# Patient Record
Sex: MALE | Race: WHITE
[De-identification: names, ages, dates, MRNs, and addresses within clinical notes are randomized per-mention and may not be internally consistent; named-entity substitution may affect disease eponyms.]

---

## 2018-10-06 ENCOUNTER — HOSPITAL ENCOUNTER (EMERGENCY)
Dept: HOSPITAL 80 - FED | Age: 23
LOS: 1 days | Discharge: HOME | End: 2018-10-07
Payer: MEDICAID

## 2018-10-06 DIAGNOSIS — F23: Primary | ICD-10-CM

## 2018-10-06 DIAGNOSIS — F19.10: ICD-10-CM

## 2018-10-06 PROCEDURE — G0480 DRUG TEST DEF 1-7 CLASSES: HCPCS

## 2018-10-06 PROCEDURE — GZ11ZZZ PSYCHOLOGICAL TESTS, PERSONALITY AND BEHAVIORAL: ICD-10-PCS

## 2018-10-06 NOTE — EDPHY
H & P


Smoking Status: Current every day smoker


Time Seen by Provider: 10/06/18 23:54


HPI/ROS: 





CHIEF COMPLAINT:  Suicidal ideation





HISTORY OF PRESENT ILLNESS:  Patient is a 22-year-old male who was being 

transported to the [facility] when he threatened suicidal ideation.  He was 

transported here for further evaluation.  Time of eye evaluation been sedated 

using IV Ativan was unable to provide his own history.  According to police 

officers he states that he had a plan to"jump off a mountain and kill himself"

he states that he has a history of multiple suicide attempts.  He also admits 

to use of heroin and methamphetamines.





REVIEW OF SYSTEMS:


Constitutional:  No fever, no chills.


Eyes:  No discharge.


ENT:  No sore throat.


Cardiovascular:  No chest pain, no palpitations.


Respiratory:  No cough, no shortness of breath.


Gastrointestinal:  No abdominal pain, no vomiting.


Genitourinary:  No hematuria.


Musculoskeletal:  No back pain.


Skin:  No rashes.


Neurological:  No headache. (Mike Sadler)


Physical Exam: 





General Appearance:  Alert and no distress.


Eyes:  Pupils equal and round no injection.


Respiratory:  Chest is nontender, lungs are clear to auscultation.


Cardiac:  regular rate and rhythm.


Gastrointestinal:  Abdomen is soft and nontender, no masses, bowel sounds 

normal.


Musculoskeletal:  Neck is supple and nontender.


Extremities have full range of motion and are nontender.


Skin:  No rashes or lesions.


 (Mike Sadler)


Constitutional: 


 Initial Vital Signs











Temperature (C)  36.7 C   10/06/18 23:48


 


Heart Rate  100   10/06/18 23:48


 


Respiratory Rate  22 H  10/06/18 23:48


 


Blood Pressure  159/103 H  10/06/18 23:48


 


O2 Sat (%)  96   10/06/18 23:48








 











O2 Delivery Mode               Room Air














Allergies/Adverse Reactions: 


 





No Known Allergies Allergy (Unverified 10/06/18 23:51)


 








Home Medications: 














 Medication  Instructions  Recorded


 


NK [No Known Home Meds]  10/06/18














Medical Decision Making


ED Course/Re-evaluation: 


The patient was stated by Ativan and was unable to obtain further history by 

the patient for the end of my shift.  He is medically cleared and awaiting 

psychiatric evaluation likely in the morning after becomes more sober.  Patient 

was signed out to Dr Narvaez at the end of my shift. (Mike Sadler)





ED PA DICTATION





I evaluated and participated in the management of the patient.  I also 

evaluated the patient independently.  My co-signature indicates that I have 

reviewed this chart and I agree with the findings and plan of care as 

documented. My personal H&P findings include:  22-year-old man presents from 

our claiming suicidal ideation.  Here he is quite agitated jumping about the 

gurney, speaking nonsensically.  I suspect intoxication with possibly 

amphetamine or cocaine.  I ordered Ativan for the patient and he slept for the 

majority of my shift.  We have been unable to obtain a urine sample.  The case 

will be signed out to Dr. Rodriguez pending UA and mental health evaluation.


 (Shante Narvaez)





I assumed care of this patient Dr. Narvaez at 7:00 a.m..  He underwent mental 

health evaluation.  I spoke with the mental health worker who agrees that he is 

not a danger to himself or others.  The patient himself will be given referral 

to [facility] but states that he is not interested in follow-up at 

this point in time.  Mental health hold has been lifted at 1:45 p.m..  He will 

be discharged from the department. (Clau Rodriguez)


Differential Diagnosis: 





Polysubstance abuse, electrolyte disturbance, brain mass, skull fracture, 

delirium tremens (Mike Sadler)





- Data Points


Laboratory Results: 


 Laboratory Results





 10/07/18 00:05 





 10/07/18 00:05 








Medications Given: 


 








Discontinued Medications





Lorazepam (Ativan Injection)  1 mg IVP EDNOW ONE


   Stop: 10/06/18 23:55


   Last Admin: 10/07/18 00:17 Dose:  2 mg








Departure





- Departure


Disposition: Home, Routine, Self-Care


Clinical Impression: 


 Acute psychosis, Polysubstance abuse





Condition: Fair


Instructions:  [facility], Polysubstance Abuse (ED)


Referrals: 


[facility] [Clinic] - As per Instructions

## 2018-10-07 VITALS — SYSTOLIC BLOOD PRESSURE: 142 MMHG | DIASTOLIC BLOOD PRESSURE: 95 MMHG

## 2018-10-07 LAB — PLATELET # BLD: 176 10^3/UL (ref 150–400)

## 2018-10-07 NOTE — ASMTTCLDSP
TLC Discharge Disposition

 

Disposition:                  Answers:  Discharge                             

If                            Answers:  Yes                                   

DISCHARGED: Patient/family                                                    

 given suicide hotline                                                        

info & SAM[employer] brochure?                                                       

Disposition Notes:            

Notes:

Pt is homeless.  He took a shower, requested wound care from the nurse and received warm clothes 

prior to his discharge.  He did not respond to inquiries into his need for medical/mental health 

treatment and was aware of the housing options.

Discharge                     

Concerns/Recommendations:     

Notes:

In consultation with [facility] ED physician, Dr ho MDit was concurred that Pt does not appear to 

meet 27-65 criteria requiring psychiatric hospitalization as Pt does not appear to be an imminent 

risk of harm to self/others/due to grave disability due to a mental illness condition.

Dr. Rodriguez vacated M1 hold at 2:51PM

Was patient given the         Answers:  Not applicable                        

Inpatient Behavioral                                                          

Health Prohibited                                                             

Belongings List while in                                                      

the ED?                                                                       

Date and time M1 hold         10/07/2018 02:51 PM

vacated (time format is       

hh:mm):                       

Type of Hold:                 Answers:  M1/72-hour Hold                       

Hold initiated by:            Answers:  Police                                

 

Date Signed:  10/07/2018 04:22 PM

Electronically Signed By:Concetta Rodríguez

## 2018-10-07 NOTE — ASMTTLCEVL
TLC Evaluation - Basic Information

 

Evaluation Start Date and     10/07/2018 01:00 PM

Time                          

Hospital Status               Answers:  M1 Hold                               

72-hr M1 Hold Start Date      10/06/2018 11:30 PM

and Time                      

Patient statement             

Notes:

"Im here to hoa with you".

Narrative                     

Notes:

Pt is a 21 y/o, homeless, severely detoxing, male brought to the ED by police on an M1 for being a 

danger to himself.  Police were taking pt to the [facility] to detox.  On the way there, per M1, "Giorgio 

was severely detoxing from meth/heroin use.  He told me, undersigned, that he had a plan to commit 

suicide by jumping off the mountains.  He stated he has attempted suicide multiple times "too many 

to count" and was going to try again because he was in pain from detoxing".



 Pt was agitated and given an Ativan injection, 1mg.  Per nursing notes, Pt had a difficult 

night, declined giving urine, "thrashing about the bed and mumbling", rolling onto the floor when 

assisting to bathroom, humping the mattress and stroking the sheets. In the morning he was able to 

eat and did allow for urine test.  He accidentally knocked the tray off the stand.  Increasingly 

agitated by noon.  Pt wanting to leave AMA, stating all of his possessions have been stolen and 

throwing his body into the walls and door. Pt able to calm down with redirection.



 Pt sat on the floor during his assessment. He wore just pants, skin, hair and nails filfthy and 

smelling poorly.  He looked many years older than his age of 22 years. he had multiple wounds in 

many states of healing on his elbows and knees. Throughout the interview his limbs jerked and 

appeared not completely in his control. Although he was able to respond to many questions directly 

and coherently, sometimes he would mumble and sometimes appear to shrug off the answer.  Due to 

this detailed information was sometimes difficult to get. His affect was unstable with his voice 

sometimes rising, and crying when he told of losing his wallet, IDs, sleeping bag and his mother's 

ashes however, he remained appropriate. He stated that he had been diagnosed with mental 

illness', but was not able to convey what they were or any of the medications that had been 

prescribed him. He wasn't able to respond to questions about arthur involvement with [facility] or [facility]. Pt said he tried the shelter's coordinated entry program but, "there was a month and a half 


waiting list. When [facility]'s Winter Program was mentioned he responded, "they don't like 

me". He does receive approximately $675 a month for SSI. He denied any suicidality stating that he 

had a hx of that, but that was years ago; it's in the past". He denied any intention to hurt 

someone else.  He denied any hallucinations, did not appear internally preoccupied and his speech 

did not contain delusional content. 



Towards the end of the assesment Pt complained that he had missed the winter clothing being given 

away today.  Clinician offered to bring him clothes and asked if he would like to shower.  He 

agreed to both and asked if a nurse could tend to the multiple wounds on his body. Pt stated that 

he would like to go to [place] where he has 2 outstanding warrants for failure to appear and 

failure to pay, "That way I could spend the winter in [facility]".

Diagnosis History             

Notes:

Unknown

Prior suicide attempts        

Notes:

Per pt there were attempts, but at least several years ago.

Prior hospitalizations        

Notes:

Pt did not respond.

Treatment Responses           

Notes:

Unknown

History of violence           

Notes:

Pt denies.

Therapist:                    None

Psychiatrist:                 None

Medications                   

(name, dosage, route, freq    

uency)                        

Notes:

None

Allergies/Reaction            

Notes:

None known

Sleep                         

Notes:

Pt is homeless.

Appetite                      

Notes:

Normal.  Pt appears thin, probablly due to lack of access to food.

Medical/Surgical history      

Notes:

None known.

Substance use history         

(frequency, intensity, his    

tory, duration)               

Notes:

Alcohol - approx 3 days a week

Marijuana - Daily, all day

Cocaine- rarely, last used yesterday

Meth - First time yesterday

Heroin - use unknown

Family composition            

Notes:

Pt's mother has .  His father lives in [place], but there is no contact.  pt has no contact 

with his 2 siblings.

Need for family               Answers:  No                                    

participation in                                                              

patient's care                                                                

Family                        

psychiatric/substance         

abuse history                 

Notes:

Pt responded that they all "have something".

Developmental history         

Notes:

Unknown



trauma hx unknown

Marital status/children       

Notes:

Denies

Living situation              

Notes:

Homeless

Sexual                        

history/orientation           

Notes:

Unknown

Peer support/family           

strengths                     

Notes:

Denies

Education level/history       

Notes:

Unknown

Work history                  

Notes:

Unknown

                      

Notes:

Denies

Legal                         

Notes:

 2 outstanding warrants for failure to appear and failure to pay in [place]. Pt says police 

regularly "harass" him.

Yarsani/Spiritual           

Notes:

Unknown

Leisure                       

Notes:

Unknown.

Patient's strengths           Answers:  Intelligent                           

(Please select at least                                                       

TWO strengths):                                                               

                                        Willingness                           

[facility] Evaluation - Mental Status Exam

 

Appearance:                   Answers:  Unclean                               

                                        Unkempt                               

                                        Disheveled                            

Eye Contact:                  Answers:  Intermittent                          

Mood:                         Answers:  Labile                                

Affect:                       Answers:  Agitated                              

                                        Flat                                  

Behavior:                     Answers:  Appropriate                           

                                        Inappropriate                         

                                        Cooperative                           

                                        Uncooperative                         

                                        Crying                                

                                        Restless                              

Speech:                       Answers:  Relevant                              

                                        Logical                               

                                        Unclear                               

                                        Coherent                              

                                        Incoherent                            

                                        Mumbling                              

Thought Process:              Answers:  Organized                             

                                        Disorganized                          

                                        Oriented                              

                                        Alert                                 

Insight:                      Answers:  Poor                                  

Judgement:                    Answers:  Poor                                  

Hallucinations:               Answers:  None                                  

Current Stage of Change       Answers:  Precontemplation                      

Pt reported to have           Answers:  No                                    

suicidal/self-injuring                                                        

ideation/behavior?                                                            

Pt reported to be making      Answers:  No                                    

suicidal/self-injuring                                                        

threats?                                                                      

Pt reported to have           Answers:  No                                    

aggression/assault                                                            

ideation/behavior?                                                            

Pt reported to be making      Answers:  No                                    

aggression/assault                                                            

threats?                                                                      

Pt exhibits inability to      Answers:  No                                    

care for self/grave                                                           

disability?                                                                   

Ideation/behavior is          Answers:  No                                    

chronic?                                                                      

Patient has a specific        Answers:  No                                    

plan?                                                                         

Pt has access to means to     Answers:  No                                    

execute the plan?                                                             

Ideation involves             Answers:  No                                    

serious/lethal intent?                                                        

Ideation has                  Answers:  No                                    

delusional/hallucinatory                                                      

content?                                                                      

History of                    Answers:  No                                    

suicidal/self-injuring                                                        

ideation, behavior, or                                                        

threats?                                                                      

History of                    Answers:  No                                    

aggressive/assaultive                                                         

ideation, behavior, or                                                        

threats?                                                                      

History of serious            Answers:  No                                    

physical harm to                                                              

self/others while in                                                          

treatment setting?                                                            

[facility] Evaluation - Suicide/Homicide Risk

 

Suicide Risk Factors:         Answers:  Agitation                             

                                        Alcohol/Heavy Drug Use                

                                        Financial Difficulties                

                                        Flat Affect                           

                                        Inadequate Social Support             

                                        Lack of Social Support                

                                        Lack/Loss of Employment               

                                        Prior Suicide Attempt(s)              

                                        Single                                

                                        Unstable Living Situation             

Homicide/violence risk        Answers:  Heavy Drug Use                        

factors:                                                                      

Current Suicidal              Answers:  No                                    

Ideation?                                                                     

Current Suicidal Ideation     Answers:  Yes                                   

in the Past 48 Hours?                                                         

Current Suicidal Ideation     Answers:  Yes                                   

in the Past Month?                                                            

Current Suicidal              Answers:  No                                    

Ideation, Worst Ever?                                                         

Suicide Internal              Answers:  Other                         Notes:  Wants to live.

Protective Factors:                                                           

Suicide External              Answers:  None                                  

Protective Factors:                                                           

Ranking of patient's          Answers:  Low                                   

suicidal risk:                                                                

Ranking of patient's          Answers:  Low                                   

homicidal risk:                                                               

[facility] Evaluation - Wrap-up

 

BDI Total Score:              Not completed

BSS Total Score:              Not completed

AXIS I Diagnosis (include     

DSM-V and ICD-10              

codes), must also be          

entered in                    

[place], which is the        

source of truth.              

Notes:

Cannabis Use Disorder, severe  304.30  (F12.20)

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



In consultation with [facility] ED physician, Dr ho MDit was concurred that Pt does not appear to 

meet 27-65 criteria requiring psychiatric hospitalization as Pt does not appear to be an imminent 

risk of harm to self/others/due to grave disability due to a mental illness condition.

Dr. Rodriguez vacated M1 hold at 2:51PM

Evaluation End Date and       10/07/2018 04:10 PM

Time (HH:KATHY):                 

 

Date Signed:  10/07/2018 04:23 PM

Electronically Signed By:Concetta Rodríguez